# Patient Record
Sex: MALE | Race: WHITE
[De-identification: names, ages, dates, MRNs, and addresses within clinical notes are randomized per-mention and may not be internally consistent; named-entity substitution may affect disease eponyms.]

---

## 2018-05-04 ENCOUNTER — HOSPITAL ENCOUNTER (EMERGENCY)
Dept: HOSPITAL 25 - ED | Age: 23
Discharge: HOME | End: 2018-05-04
Payer: SELF-PAY

## 2018-05-04 VITALS — SYSTOLIC BLOOD PRESSURE: 123 MMHG | DIASTOLIC BLOOD PRESSURE: 73 MMHG

## 2018-05-04 DIAGNOSIS — K52.9: Primary | ICD-10-CM

## 2018-05-04 DIAGNOSIS — R05: ICD-10-CM

## 2018-05-04 DIAGNOSIS — R19.7: ICD-10-CM

## 2018-05-04 DIAGNOSIS — R10.9: ICD-10-CM

## 2018-05-04 LAB
BASOPHILS # BLD AUTO: 0 10^3/UL (ref 0–0.2)
EOSINOPHIL # BLD AUTO: 0 10^3/UL (ref 0–0.6)
HCT VFR BLD AUTO: 41 % (ref 42–52)
HGB BLD-MCNC: 14.1 G/DL (ref 14–18)
LYMPHOCYTES # BLD AUTO: 1.2 10^3/UL (ref 1–4.8)
MCH RBC QN AUTO: 30 PG (ref 27–31)
MCHC RBC AUTO-ENTMCNC: 34 G/DL (ref 31–36)
MCV RBC AUTO: 87 FL (ref 80–94)
MONOCYTES # BLD AUTO: 0.3 10^3/UL (ref 0–0.8)
NEUTROPHILS # BLD AUTO: 2.2 10^3/UL (ref 1.5–7.7)
NRBC # BLD AUTO: 0 10^3/UL
NRBC BLD QL AUTO: 0
PLATELET # BLD AUTO: 178 10^3/UL (ref 150–450)
RBC # BLD AUTO: 4.77 10^6/UL (ref 4–5.4)
WBC # BLD AUTO: 3.8 10^3/UL (ref 3.5–10.8)

## 2018-05-04 PROCEDURE — 85025 COMPLETE CBC W/AUTO DIFF WBC: CPT

## 2018-05-04 PROCEDURE — 80053 COMPREHEN METABOLIC PANEL: CPT

## 2018-05-04 PROCEDURE — 86140 C-REACTIVE PROTEIN: CPT

## 2018-05-04 PROCEDURE — 83605 ASSAY OF LACTIC ACID: CPT

## 2018-05-04 PROCEDURE — 99283 EMERGENCY DEPT VISIT LOW MDM: CPT

## 2018-05-04 PROCEDURE — 83690 ASSAY OF LIPASE: CPT

## 2018-05-04 PROCEDURE — 36415 COLL VENOUS BLD VENIPUNCTURE: CPT

## 2018-05-04 NOTE — ED
Va VARELA Emily, scribed for Kunal Ramos MD on 05/04/18 at 1635 .





Abdominal Pain/Male





- HPI Summary


HPI Summary: 


This patient is a 22 year old M presenting to [facility] with a chief complaint of 

waxing and waning upper abdominal pain that began on 4/29/2018. The patient 

rates the pain 3/10 in severity. Symptoms aggravated by nothing. Symptoms 

alleviated by nothing. Patient reports cough and diarrhea (began this morning). 

Patient denies nausea and vomiting.








- History of Current Complaint


Chief Complaint: EDAbdPain


Stated Complaint: ABD PAIN


Time Seen by Provider: 05/04/18 16:28


Hx Obtained From: Patient


Onset/Duration: Sudden Onset, Lasting Days, Still Present


Timing: Constant


Severity Initially: Mild


Severity Currently: Mild


Pain Intensity: 3


Pain Scale Used: 0-10 Numeric


Location: Other - Upper abdominal


Radiates: No


Aggravating Factor(s): Nothing


Alleviating Factor(s): Nothing


Associated Signs And Symptoms: Positive: Other - Positive cough and diarrhea (

began this morning). Negative nausea and vomiting





- Allergies/Home Medications


Allergies/Adverse Reactions: 


 Allergies











Allergy/AdvReac Type Severity Reaction Status Date / Time


 


No Known Allergies Allergy   Verified 05/04/18 15:54














PMH/Surg Hx/FS Hx/Imm Hx


Previously Healthy: Yes


Cardiovascular History: 


   Denies: Hx Hypertension, Hx Pacemaker/ICD


Respiratory History: 


   Denies: Hx Asthma


 History: 


   Denies: Hx Renal Disease


Sensory History: 


   Denies: Hx Hearing Aid


Psychiatric History: 


   Denies: Hx Panic Disorder


Infectious Disease History: No


Infectious Disease History: 


   Denies: Traveled Outside the [place] in Last 30 Days





- Family History


Known Family History: 


   Negative: Cardiac Disease, Diabetes





- Social History


Occupation: Student


Lives: Alone


Alcohol Use: None


Substance Use Type: Reports: None


Smoking Status (MU): Never Smoked Tobacco


Type: Smokeless Tobacco





Review of Systems


Positive: Cough


Positive: Abdominal Pain, Diarrhea.  Negative: Vomiting, Nausea


All Other Systems Reviewed And Are Negative: Yes





Physical Exam





- Summary


Physical Exam Summary: 


Appearance: Well appearing, no pain distress


Skin: warm, dry, reflects adequate perfusion


Head/face: normal


Eyes: EOMI, NERI


ENT: normal


Neck: supple, non-tender


Respiratory: CTA, breath sounds present


Cardiovascular: RRR, pulses symmetrical 


Abdomen: non-tender, soft


Bowel Sounds: present


Musculoskeletal: normal, strength/ROM intact


Neuro: normal, sensory motor intact, A&Ox3





Triage Information Reviewed: Yes


Vital Signs On Initial Exam: 


 Initial Vitals











Temp Pulse Resp BP Pulse Ox


 


 99.8 F   92   17   139/77   98 


 


 05/04/18 15:51  05/04/18 15:51  05/04/18 15:51  05/04/18 15:51  05/04/18 15:51











Vital Signs Reviewed: Yes





Diagnostics





- Vital Signs


 Vital Signs











  Temp Pulse Resp BP Pulse Ox


 


 05/04/18 15:51  99.8 F  92  17  139/77  98














- Laboratory


Lab Results: 


 Lab Results











  05/04/18 05/04/18 05/04/18 Range/Units





  16:28 16:28 16:29 


 


WBC    3.8  (3.5-10.8)  10^3/ul


 


RBC    4.77  (4.0-5.4)  10^6/ul


 


Hgb    14.1  (14.0-18.0)  g/dl


 


Hct    41 L  (42-52)  %


 


MCV    87  (80-94)  fL


 


MCH    30  (27-31)  pg


 


MCHC    34  (31-36)  g/dl


 


RDW    14  (10.5-15)  %


 


Plt Count    178  (150-450)  10^3/ul


 


MPV    7.2 L  (7.4-10.4)  um3


 


Neut % (Auto)    58.8  (38-83)  %


 


Lymph % (Auto)    31.5  (25-47)  %


 


Mono % (Auto)    8.2 H  (0-7)  %


 


Eos % (Auto)    0.5  (0-6)  %


 


Baso % (Auto)    1.0  (0-2)  %


 


Absolute Neuts (auto)    2.2  (1.5-7.7)  10^3/ul


 


Absolute Lymphs (auto)    1.2  (1.0-4.8)  10^3/ul


 


Absolute Monos (auto)    0.3  (0-0.8)  10^3/ul


 


Absolute Eos (auto)    0  (0-0.6)  10^3/ul


 


Absolute Basos (auto)    0  (0-0.2)  10^3/ul


 


Absolute Nucleated RBC    0  10^3/ul


 


Nucleated RBC %    0  


 


Sodium  136 L    (139-145)  mmol/L


 


Potassium  3.4 L    (3.5-5.0)  mmol/L


 


Chloride  103    (101-111)  mmol/L


 


Carbon Dioxide  27    (22-32)  mmol/L


 


Anion Gap  6    (2-11)  mmol/L


 


BUN  14    (6-24)  mg/dL


 


Creatinine  0.90    (0.67-1.17)  mg/dL


 


Est GFR ( Amer)  138.4    (>60)  


 


Est GFR (Non-Af Amer)  107.6    (>60)  


 


BUN/Creatinine Ratio  15.6    (8-20)  


 


Glucose  141 H    ()  mg/dL


 


Lactic Acid   1.3   (0.5-2.0)  mmol/L


 


Calcium  9.3    (8.6-10.3)  mg/dL


 


Total Bilirubin  0.90    (0.2-1.0)  mg/dL


 


AST  11 L    (13-39)  U/L


 


ALT  11    (7-52)  U/L


 


Alkaline Phosphatase  50    ()  U/L


 


C-Reactive Protein  < 1.00    (< 5.00)  mg/L


 


Total Protein  7.1    (6.4-8.9)  g/dL


 


Albumin  4.4    (3.2-5.2)  g/dL


 


Globulin  2.7    (2-4)  g/dL


 


Albumin/Globulin Ratio  1.6    (1-3)  


 


Lipase  < 10 L    (11.0-82.0)  U/L











Result Diagrams: 


 05/04/18 16:29





 05/04/18 16:28


Lab Statement: Any lab studies that have been ordered have been reviewed, and 

results considered in the medical decision making process.





Re-Evaluation





- Re-Evaluation


  ** First Eval


Re-Evaluation Time: 17:08


Change: Unchanged


Comment: Discussed plan of care with patient





Abdominal Pain Fem Course/Dx





- Course


Course Of Treatment: Patient of a vagal episode during IV placement.  He 

received IV fluids and oral treatments.  Is feeling better.  He has normal 

laboratories and no tenderness on exam.  Likely viral cause.


Assessment/Plan: Pt had a vasovagal event during IV placement.





- Diagnoses


Differential Diagnosis/HQI/PQRI: Constipation, Pancreatitis, Peptic Ulcer 

Disease, Other - Gastroenteritis


Provider Diagnoses: 


 Gastroenteritis








Discharge





- Sign-Out/Discharge


Documenting (check all that apply): Discharge/Admit/Transfer - Discharge home





- Discharge Plan


Condition: Good


Disposition: HOME


Prescriptions: 


Famotidine TAB* [Pepcid 20 MG TAB*] 20 mg PO BID #10 tab


Hyoscyamine Sulfate 0.125 mg PO Q6H PRN #20 tab.rapdis


 PRN Reason: cramping


Ondansetron ODT TAB* [Zofran 4 MG Odt TAB*] 4 mg PO Q8H PRN #10 tab.odt


 PRN Reason: Nausea


Patient Education Materials:  Gastroenteritis (ED)


Forms:  *Work Release


Referrals: 


Delio Ortiz MD [Primary Care Provider] - 


Additional Instructions: 


Drink plenty of fluids.  Imodium as needed for diarrhea.  Stay well hydrated.  

Return with fever, increased pain, pain in the right lower abdomen, worse or 

other concerns as discussed.





- Billing Disposition and Condition


Condition: GOOD


Disposition: HOME





The documentation as recorded by the Va lenz Emily accurately reflects the 

service I personally performed and the decisions made by me, Kunal Ramos MD.

## 2019-12-30 ENCOUNTER — HOSPITAL ENCOUNTER (EMERGENCY)
Dept: HOSPITAL 25 - ED | Age: 24
Discharge: HOME | End: 2019-12-30
Payer: SELF-PAY

## 2019-12-30 VITALS — SYSTOLIC BLOOD PRESSURE: 129 MMHG | DIASTOLIC BLOOD PRESSURE: 76 MMHG

## 2019-12-30 DIAGNOSIS — S60.221A: Primary | ICD-10-CM
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
## 2019-12-30 PROCEDURE — 99282 EMERGENCY DEPT VISIT SF MDM: CPT

## 2019-12-30 NOTE — ED
Upper Extremity Pain





- HPI Summary


HPI Summary: 


24-year-old male presents with right hand injury.  He states he got upset at 

his brother and ended up punching a wall.  He punches such multiple times.  Has 

swelling noted to posterior aspect of thrid and fourth finger.  He states he 

has full range motion.  Has denies any previous fracture to the area.  No 

numbness or tingling.  Has no medical conditions. 





- History of Current Complaint


Chief Complaint: EDExtremityLower


Stated Complaint: RT HAND INJURY PER PT


Time Seen by Provider: 12/30/19 17:42





- Allergies/Home Medications


Allergies/Adverse Reactions: 


 Allergies











Allergy/AdvReac Type Severity Reaction Status Date / Time


 


No Known Allergies Allergy   Verified 05/04/18 15:54














PMH/Surg Hx/FS Hx/Imm Hx


Endocrine/Hematology History: 


   Denies: Hx Anticoagulant Therapy


Cardiovascular History: 


   Denies: Hx Hypertension, Hx Pacemaker/ICD


Respiratory History: 


   Denies: Hx Asthma


 History: 


   Denies: Hx Renal Disease


Sensory History: 


   Denies: Hx Hearing Aid


Psychiatric History: 


   Denies: Hx Panic Disorder


Infectious Disease History: No


Infectious Disease History: 


   Denies: Traveled Outside the [place] in Last 30 Days





- Family History


Known Family History: 


   Negative: Cardiac Disease, Diabetes





- Social History


Alcohol Use: None


Substance Use Type: Reports: None


Smoking Status (MU): Never Smoked Tobacco


Type: Smokeless Tobacco





Review of Systems


Negative: Fever


Negative: Chest Pain


Negative: Shortness Of Breath


Positive: Myalgia - right hand injury


All Other Systems Reviewed And Are Negative: Yes





Physical Exam


Triage Information Reviewed: Yes


Vital Signs On Initial Exam: 


 Initial Vitals











Temp Pulse Resp BP Pulse Ox


 


 99.4 F   98   18   146/84   98 


 


 12/30/19 17:38  12/30/19 17:38  12/30/19 17:38  12/30/19 17:38  12/30/19 17:38











Vital Signs Reviewed: Yes


Appearance: Positive: Well-Appearing


Skin: Positive: Warm, Dry, Other - abrasions to hands


Head/Face: Positive: Normal Head/Face Inspection


Eyes: Positive: Normal, Conjunctiva Clear


ENT: Positive: Pharynx normal


Respiratory/Lung Sounds: Positive: Clear to Auscultation, Breath Sounds Present


Cardiovascular: Positive: Normal, RRR


Musculoskeletal: Positive: Strength/ROM Intact - right hand, Other - edema to 

posterior aspect of right hand over 3-4th finger, good pulses


Neurological: Positive: Normal


Psychiatric: Positive: Normal





Procedures





- Sedation


Patient Received Moderate/Deep Sedation with Procedure: No





Diagnostics





- Vital Signs


 Vital Signs











  Temp Pulse Resp BP Pulse Ox


 


 12/30/19 17:38  99.4 F  98  18  146/84  98














- Laboratory


Lab Statement: Any lab studies that have been ordered have been reviewed, and 

results considered in the medical decision making process.





- Radiology


  ** hand


Radiology Interpretation Completed By: ED Physician


Summary of Radiographic Findings: no fracture





Course/Dx





- Course


Course Of Treatment: 24-year-old male presents with right hand injury.  He 

states he got upset at his brother and ended up punching a wall.  He punches 

such multiple times.  Has swelling noted to posterior aspect of thrid and 

fourth finger.  He states he has full range motion.  Has denies any previous 

fracture to the area.  No numbness or tingling.  Has no medical conditions.  On 

exam has swelling over 3-4th MCP.  Has abrasions on the area.  Cleaned 

abrasions.  X-ray shows no fracture.  Placed in a Ace wrap.  Told to ice 

elevate.  Patient understands agrees with plan.





- Diagnoses


Differential Diagnosis/HQI/PQRI: Positive: Fracture (Closed)


Provider Diagnoses: 


 Contusion of right hand








Discharge ED





- Sign-Out/Discharge


Documenting (check all that apply): Patient Departure





- Discharge Plan


Condition: Good


Disposition: HOME


Patient Education Materials:  Contusion in Adults (ED)


Referrals: 


Delio Ortiz MD [Primary Care Provider] - 


Additional Instructions: 


Take Tylenol or ibuprofen every 6 hours as needed for pain


Apply ice, rest, elevate   


Keep ace on area


Follow up with primary care physician within 5 days


Return to ED if develop any new or worsening symptoms    





- Billing Disposition and Condition


Condition: GOOD


Disposition: Home